# Patient Record
Sex: FEMALE | Race: WHITE | NOT HISPANIC OR LATINO | Employment: PART TIME | URBAN - NONMETROPOLITAN AREA
[De-identification: names, ages, dates, MRNs, and addresses within clinical notes are randomized per-mention and may not be internally consistent; named-entity substitution may affect disease eponyms.]

---

## 2020-01-03 PROCEDURE — 99283 EMERGENCY DEPT VISIT LOW MDM: CPT

## 2020-01-04 ENCOUNTER — HOSPITAL ENCOUNTER (EMERGENCY)
Facility: HOSPITAL | Age: 50
Discharge: HOME | End: 2020-01-04
Admitting: EMERGENCY MEDICINE

## 2020-01-04 ENCOUNTER — APPOINTMENT (OUTPATIENT)
Dept: CT IMAGING | Facility: HOSPITAL | Age: 50
End: 2020-01-04

## 2020-01-04 VITALS
WEIGHT: 189 LBS | TEMPERATURE: 97.9 F | HEIGHT: 68 IN | OXYGEN SATURATION: 99 % | DIASTOLIC BLOOD PRESSURE: 80 MMHG | SYSTOLIC BLOOD PRESSURE: 125 MMHG | RESPIRATION RATE: 18 BRPM | HEART RATE: 85 BPM | BODY MASS INDEX: 28.64 KG/M2

## 2020-01-04 DIAGNOSIS — S16.1XXA STRAIN OF NECK MUSCLE, INITIAL ENCOUNTER: ICD-10-CM
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
## 2020-01-04 DIAGNOSIS — S00.83XA CONTUSION OF FACE, INITIAL ENCOUNTER: ICD-10-CM

## 2020-01-04 DIAGNOSIS — S06.0X1A CONCUSSION WITH LOSS OF CONSCIOUSNESS OF 30 MINUTES OR LESS, INITIAL ENCOUNTER: ICD-10-CM

## 2020-01-04 PROCEDURE — 72125 CT NECK SPINE W/O DYE: CPT

## 2020-01-04 PROCEDURE — 70450 CT HEAD/BRAIN W/O DYE: CPT

## 2020-01-04 PROCEDURE — 90471 IMMUNIZATION ADMIN: CPT | Performed by: NURSE PRACTITIONER

## 2020-01-04 PROCEDURE — 90715 TDAP VACCINE 7 YRS/> IM: CPT | Performed by: NURSE PRACTITIONER

## 2020-01-04 PROCEDURE — 25010000002 TDAP 5-2.5-18.5 LF-MCG/0.5 SUSPENSION: Performed by: NURSE PRACTITIONER

## 2020-01-04 PROCEDURE — 96372 THER/PROPH/DIAG INJ SC/IM: CPT

## 2020-01-04 PROCEDURE — 70486 CT MAXILLOFACIAL W/O DYE: CPT

## 2020-01-04 PROCEDURE — 25010000002 ONDANSETRON PER 1 MG: Performed by: NURSE PRACTITIONER

## 2020-01-04 PROCEDURE — 25010000002 PROMETHAZINE PER 50 MG: Performed by: NURSE PRACTITIONER

## 2020-01-04 PROCEDURE — 25010000002 BUTORPHANOL PER 1 MG: Performed by: NURSE PRACTITIONER

## 2020-01-04 RX ORDER — CYCLOBENZAPRINE HCL 10 MG
10 TABLET ORAL 3 TIMES DAILY PRN
Qty: 20 TABLET | Refills: 0 | Status: SHIPPED | OUTPATIENT
Start: 2020-01-04

## 2020-01-04 RX ORDER — PROMETHAZINE HYDROCHLORIDE 25 MG/ML
25 INJECTION, SOLUTION INTRAMUSCULAR; INTRAVENOUS ONCE
Status: COMPLETED | OUTPATIENT
Start: 2020-01-04 | End: 2020-01-04

## 2020-01-04 RX ORDER — ONDANSETRON 2 MG/ML
4 INJECTION INTRAMUSCULAR; INTRAVENOUS ONCE
Status: COMPLETED | OUTPATIENT
Start: 2020-01-04 | End: 2020-01-04

## 2020-01-04 RX ORDER — ACETAMINOPHEN AND CODEINE PHOSPHATE 300; 30 MG/1; MG/1
1 TABLET ORAL EVERY 4 HOURS PRN
Qty: 12 TABLET | Refills: 0 | Status: SHIPPED | OUTPATIENT
Start: 2020-01-04

## 2020-01-04 RX ORDER — ONDANSETRON 4 MG/1
4 TABLET, ORALLY DISINTEGRATING ORAL EVERY 6 HOURS PRN
Qty: 10 TABLET | Refills: 0 | Status: SHIPPED | OUTPATIENT
Start: 2020-01-04

## 2020-01-04 RX ORDER — BUTORPHANOL TARTRATE 1 MG/ML
1 INJECTION, SOLUTION INTRAMUSCULAR; INTRAVENOUS ONCE
Status: COMPLETED | OUTPATIENT
Start: 2020-01-04 | End: 2020-01-04

## 2020-01-04 RX ADMIN — TETANUS TOXOID, REDUCED DIPHTHERIA TOXOID AND ACELLULAR PERTUSSIS VACCINE, ADSORBED 0.5 ML: 5; 2.5; 8; 8; 2.5 SUSPENSION INTRAMUSCULAR at 03:04

## 2020-01-04 RX ADMIN — BUTORPHANOL TARTRATE 1 MG: 1 INJECTION, SOLUTION INTRAMUSCULAR; INTRAVENOUS at 01:15

## 2020-01-04 RX ADMIN — PROMETHAZINE HYDROCHLORIDE 25 MG: 25 INJECTION INTRAMUSCULAR; INTRAVENOUS at 03:05

## 2020-01-04 RX ADMIN — ONDANSETRON HYDROCHLORIDE 4 MG: 2 SOLUTION INTRAMUSCULAR; INTRAVENOUS at 01:15

## 2020-01-04 NOTE — DISCHARGE INSTRUCTIONS
Return to ER if symptoms worsen   Ice to face      Concussion, Adult  A concussion is a brain injury from a direct hit (blow) to the head or body. This injury causes the brain to shake quickly back and forth inside the skull. It is caused by:  · A hit to the head.  · A quick and sudden movement (jolt) of the head or neck.  How fast you will get better from a concussion depends on many things. Recovery can take time. It is important to wait to return to activity until a doctor says it is safe and your symptoms are all gone.  Follow these instructions at home:  Activity  · Limit activities that need a lot of thought or concentration. You may need to talk with your  or teachers about this. Limit activities such as:  ? Homework or work for your job.  ? Watching TV.  ? Computer work.  ? Playing memory games and puzzles.  · Rest. Rest helps the brain to heal. Make sure you:  ? Get plenty of sleep at night. Do not stay up late.  ? Rest during the day. Take naps or rest breaks when you feel tired.  · Do not do activities that could cause a second concussion, such as riding a bike or playing sports. It can be dangerous if you get another concussion before the first one has healed.  · Ask your doctor when you can return to your normal activities, like driving, riding a bike, or using machinery. Your ability to react may be slower. Do not do these activities if you are dizzy. Your doctor will likely give you a plan for slowly going back to activities.  General instructions  · Take over-the-counter and prescription medicines only as told by your doctor.  · Do not drink alcohol until your doctor says you can.  · Watch your symptoms and tell other people to do the same. Other problems (complications) can happen after a concussion. Older adults with a brain injury may have a higher risk of serious problems, such as a blood clot in the brain.  · Tell your , teachers, school nurse, school counselor, , or   about your injury and symptoms. Tell them about what you can or cannot do. They should watch you for:  ? More problems with attention or concentration.  ? More trouble remembering or learning new information.  ? More time needed to do tasks or assignments.  ? Being more annoyed (irritable) or having a harder time dealing with stress.  ? Any other symptoms that get worse.  · Keep all follow-up visits as told by your doctor. This is important.  Prevention  · It is very important that you donot get another brain injury, especially before you have healed. In rare cases, another injury can cause permanent brain damage, brain swelling, or death. You have the most risk if you get another head injury in the first 7-10 days after you were hurt before. To avoid injuries:  ? Avoid activities that could make you get a second concussion, like contact sports.  ? When you have returned to sports or activities:  § Avoid plays or moves that can cause you to crash into another person. This is how most concussions happen.  § Follow the rules and be respectful of other players.  ? Get regular exercise that includes strength and balance training.  ? Wear a helmet when you do activities like:  § Biking.  § Skiing.  § Skateboarding.  § Skating.  ? Helmets can help protect you from serious skull and brain injuries, but they do not protect your from a concussion. Even when wearing a helmet, you should avoid being hit in the head.  Contact a doctor if:  · Your symptoms get worse or they do not get better.  · You have new symptoms.  · You have another injury.  Get help right away if:  · You have bad headaches or your headaches get worse.  · You have weakness in any part of your body.  · You are confused.  · Your coordination gets worse.  · You keep throwing up (vomiting).  · You feel more sleepy than normal.  · You twitch or shake violently (convulse) or have a seizure.  · Your speech is not clear (is slurred).  · You have  strange behavior changes.  · You have changes in how you see (vision).  · You pass out (lose consciousness).  Summary  · A concussion is a brain injury from a direct hit (blow) to the head or body.  · This condition is treated with rest and careful watching of symptoms.  · If you keep having symptoms, call your doctor.  This information is not intended to replace advice given to you by your health care provider. Make sure you discuss any questions you have with your health care provider.  Document Released: 12/06/2010 Document Revised: 01/29/2019 Document Reviewed: 01/29/2019  [employer] Interactive Patient Education © 2019 [employer]      Cervical Sprain    A cervical sprain is a stretch or tear in one or more of the tough, cord-like tissues that connect bones (ligaments) in the neck. Cervical sprains can range from mild to severe. Severe cervical sprains can cause the spinal bones (vertebrae) in the neck to be unstable. This can lead to spinal cord damage and can result in serious nervous system problems.  The amount of time that it takes for a cervical sprain to get better depends on the cause and extent of the injury. Most cervical sprains heal in 4-6 weeks.  What are the causes?  Cervical sprains may be caused by an injury (trauma), such as from a motor vehicle accident, a fall, or sudden forward and backward whipping movement of the head and neck (whiplash injury).  Mild cervical sprains may be caused by wear and tear over time, such as from poor posture, sitting in a chair that does not provide support, or looking up or down for long periods of time.  What increases the risk?  The following factors may make you more likely to develop this condition:  · Participating in activities that have a high risk of trauma to the neck. These include contact sports, auto racing, gymnastics, and diving.  · Taking risks when driving or riding in a motor vehicle, such as speeding.  · Having osteoarthritis of the  spine.  · Having poor strength and flexibility of the neck.  · A previous neck injury.  · Having poor posture.  · Spending a lot of time in certain positions that put stress on the neck, such as sitting at a computer for long periods of time.  What are the signs or symptoms?  Symptoms of this condition include:  · Pain, soreness, stiffness, tenderness, swelling, or a burning sensation in the front, back, or sides of the neck.  · Sudden tightening of neck muscles that you cannot control (muscle spasms).  · Pain in the shoulders or upper back.  · Limited ability to move the neck.  · Headache.  · Dizziness.  · Nausea.  · Vomiting.  · Weakness, numbness, or tingling in a hand or an arm.  Symptoms may develop right away after injury, or they may develop over a few days. In some cases, symptoms may go away with treatment and return (recur) over time.  How is this diagnosed?  This condition may be diagnosed based on:  · Your medical history.  · Your symptoms.  · Any recent injuries or known neck problems that you have, such as arthritis in the neck.  · A physical exam.  · Imaging tests, such as:  ? X-rays.  ? MRI.  ? CT scan.  How is this treated?  This condition is treated by resting and icing the injured area and doing physical therapy exercises. Depending on the severity of your condition, treatment may also include:  · Keeping your neck in place (immobilized) for periods of time. This may be done using:  ? A cervical collar. This supports your chin and the back of your head.  ? A cervical traction device. This is a sling that holds up your head. This removes weight and pressure from your neck, and it may help to relieve pain.  · Medicines that help to relieve pain and inflammation.  · Medicines that help to relax your muscles (muscle relaxants).  · Surgery. This is rare.  Follow these instructions at home:  If you have a cervical collar:    · Wear it as told by your health care provider. Do not remove the collar unless  instructed by your health care provider.  · Ask your health care provider before you make any adjustments to your collar.  · If you have long hair, keep it outside of the collar.  · Ask your health care provider if you can remove the collar for cleaning and bathing. If you are allowed to remove the collar for cleaning or bathing:  ? Follow instructions from your health care provider about how to remove the collar safely.  ? Clean the collar by wiping it with mild soap and water and drying it completely.  ? If your collar has removable pads, remove them every 1-2 days and wash them by hand with soap and water. Let them air-dry completely before you put them back in the collar.  ? Check your skin under the collar for irritation or sores. If you see any, tell your health care provider.  Managing pain, stiffness, and swelling    · If directed, use a cervical traction device as told by your health care provider.  · If directed, apply heat to the affected area before you do your physical therapy or as often as told by your health care provider. Use the heat source that your health care provider recommends, such as a moist heat pack or a heating pad.  ? Place a towel between your skin and the heat source.  ? Leave the heat on for 20-30 minutes.  ? Remove the heat if your skin turns bright red. This is especially important if you are unable to feel pain, heat, or cold. You may have a greater risk of getting burned.  · If directed, put ice on the affected area:  ? Put ice in a plastic bag.  ? Place a towel between your skin and the bag.  ? Leave the ice on for 20 minutes, 2-3 times a day.  Activity  · Do not drive while wearing a cervical collar. If you do not have a cervical collar, ask your health care provider if it is safe to drive while your neck heals.  · Do not drive or use heavy machinery while taking prescription pain medicine or muscle relaxants, unless your health care provider approves.  · Do not lift anything  that is heavier than 10 lb (4.5 kg) until your health care provider tells you that it is safe.  · Rest as directed by your health care provider. Avoid positions and activities that make your symptoms worse. Ask your health care provider what activities are safe for you.  · If physical therapy was prescribed, do exercises as told by your health care provider or physical therapist.  General instructions  · Take over-the-counter and prescription medicines only as told by your health care provider.  · Do not use any products that contain nicotine or tobacco, such as cigarettes and e-cigarettes. These can delay healing. If you need help quitting, ask your health care provider.  · Keep all follow-up visits as told by your health care provider or physical therapist. This is important.  How is this prevented?  To prevent a cervical sprain from happening again:  · Use and maintain good posture. Make any needed adjustments to your workstation to help you use good posture.  · Exercise regularly as directed by your health care provider or physical therapist.  · Avoid risky activities that may cause a cervical sprain.  Contact a health care provider if:  · You have symptoms that get worse or do not get better after 2 weeks of treatment.  · You have pain that gets worse or does not get better with medicine.  · You develop new, unexplained symptoms.  · You have sores or irritated skin on your neck from wearing your cervical collar.  Get help right away if:  · You have severe pain.  · You develop numbness, tingling, or weakness in any part of your body.  · You cannot move a part of your body (you have paralysis).  · You have neck pain along with:  ? Severe dizziness.  ? Headache.  Summary  · A cervical sprain is a stretch or tear in one or more of the tough, cord-like tissues that connect bones (ligaments) in the neck.  · Cervical sprains may be caused by an injury (trauma), such as from a motor vehicle accident, a fall, or sudden  forward and backward whipping movement of the head and neck (whiplash injury).  · Symptoms may develop right away after injury, or they may develop over a few days.  · This condition is treated by resting and icing the injured area and doing physical therapy exercises.  This information is not intended to replace advice given to you by your health care provider. Make sure you discuss any questions you have with your health care provider.  Document Released: 10/14/2008 Document Revised: 08/16/2017 Document Reviewed: 08/16/2017  [employer] Interactive Patient Education © 2019 [employer]      Cryotherapy  What is cryotherapy?  Cryotherapy, or cold therapy, is a treatment that uses cold temperatures to treat an injury or medical condition. It includes using cold packs or ice packs to reduce pain and swelling.  Who should not use cryotherapy?  Cryotherapy is not safe for people who cannot tell you if they are in pain, such as small children and people who have dementia. Cryotherapy is also not safe for people with certain conditions, such as:  · Raynaud phenomenon.  · Cold hypersensitivity.  · Numbness or loss of feeling in the area being iced.  Cryotherapy may or may not be safe for people with certain other conditions. Do not use cryotherapy without your health care provider's approval if you have:  · A heart condition.  · High blood pressure.  · Open or healing wounds.  · An infection.  · Rheumatoid arthritis.  · Poor circulation.  · Diabetes.  · Certain skin conditions.  How do I use cryotherapy?  To use cryotherapy at home to reduce pain and swelling:  · Place a towel between the cold source and your skin.  · Apply the cold source for no more than 20 minutes at a time.  · Check your skin after 5 minutes to make sure there are no signs of a poor response to cold or skin damage. Check for:  ? White spots on your skin. Your skin may look blotchy or mottled.  ? Skin that looks blue or pale.  ? Skin that feels waxy or  hard.  · Repeat these steps as many times each day as told by your health care provider.  How can I make a cold pack?  When using a cold pack at home to reduce pain and swelling, you can use:  · A silica gel cold pack that has been left in the freezer. You can buy this online or in stores.  · A plastic bag of frozen vegetables.  · A sealable plastic bag that has been filled with crushed ice.  Always wrap the pack in a dry or damp towel to avoid direct contact with your skin.  Contact a health care provider if:  · You develop white spots on your skin. This may give your skin a blotchy or mottled look.  · Your skin turns blue or pale.  · Your skin becomes waxy or hard.  · Your swelling gets worse.  This information is not intended to replace advice given to you by your health care provider. Make sure you discuss any questions you have with your health care provider.  Document Released: 08/13/2012 Document Revised: 02/22/2018 Document Reviewed: 08/31/2016  [employer] Interactive Patient Education © 2019 [employer]      Facial or Scalp Contusion    A facial or scalp contusion is a deep bruise (contusion) on the face or head. Injuries to the face and head generally cause a lot of swelling, especially around the eyes. Contusions are the result of an injury that caused bleeding under the skin. The contusion may turn blue, purple, or yellow. Minor injuries will give you a painless contusion, but more severe contusions may stay painful and swollen for a few weeks.  What are the causes?  A facial or scalp contusion is caused by a blunt injury, fall, or trauma to the face or head area.  What are the signs or symptoms?  Symptoms of this condition include:  · Swelling of the injured area.  · Discoloration of the injured area.  · Tenderness, soreness, or pain in the injured area.  How is this diagnosed?  This condition is diagnosed based on your medical history and a physical exam. An X-ray exam, CT scan, or MRI may be needed to  check for any additional injuries, such as broken bones (fractures).  How is this treated?  Often, the best treatment for a facial or scalp contusion is applying cold compresses to the injured area. Over-the-counter medicines may also be recommended for pain control.  Follow these instructions at home:  · Take over-the-counter and prescription medicines only as told by your health care provider.  · If directed, apply ice to the injured area.  ? Put ice in a plastic bag.  ? Place a towel between your skin and the bag.  ? Leave the ice on for 20 minutes, 2-3 times a day.  · Keep all follow-up visits as told by your health care provider. This is important.  Contact a health care provider if:  · You have trouble biting or chewing.  · Your pain or swelling gets worse.  · You have pain when you move your eyes.  Get help right away if:  · You have severe pain or a headache that is not relieved by medicine.  · You have unusual sleepiness, confusion, or personality changes.  · You vomit.  · You have a nosebleed that does not stop.  · You have double vision or blurred vision.  · You have a continuous clear fluid draining from your nose or ear.  · You have trouble walking or using your arms or legs.  · You have severe dizziness.  Summary  · A facial or scalp contusion is a deep bruise (contusion) on the face or head.  · Contusions are the result of an injury that caused bleeding under the skin.  · Minor injuries will give you a painless contusion, but more severe contusions may stay painful and swollen for a few weeks.  · Often, the best treatment for a facial or scalp contusion is applying cold compresses to the injured area.  This information is not intended to replace advice given to you by your health care provider. Make sure you discuss any questions you have with your health care provider.  Document Released: 01/25/2006 Document Revised: 11/07/2017 Document Reviewed: 11/07/2017  [employer] Interactive Patient Education ©  2019 [employer]      Head Injury, Adult  There are many types of head injuries. Head injuries can be as minor as a bump, or they can be more severe. More severe head injuries include:  · A jarring injury to the brain (concussion).  · A bruise of the brain (contusion). This means there is bleeding in the brain that can cause swelling.  · A cracked skull (skull fracture).  · Bleeding in the brain that collects, clots, and forms a bump (hematoma).  After a head injury, you may need to be observed for a while in the emergency department or urgent care. Sometimes admission to the hospital is needed.  After a head injury has happened, most problems occur within the first 24 hours, but side effects may occur up to 7-10 days after the injury. It is important to watch your condition for any changes.  What are the causes?  There are many possible causes of a head injury. A serious head injury may happen to someone who is in a car accident (motor vehicle collision). Other causes of major head injuries include bicycle or motorcycle accidents, sports injuries, and falls.  Risk factors  This condition is more likely to occur in people who:  · Drink a lot of alcohol or use drugs.  · Are over the age of 65.  · Are at risk for falls.  What are the symptoms?  There are many possible symptoms of a head injury. Visible symptoms of a head injury include a bruise, bump, or bleeding at the site of the injury. Other non-visible symptoms include:  · Feeling sleepy or not being able to stay awake.  · Passing out.  · Headache.  · Seizures.  · Dizziness.  · Confusion.  · Memory problems.  · Nausea or vomiting.  Other possible symptoms that may develop after the head injury include:  · Poor attention and concentration.  · Fatigue or tiring easily.  · Irritability.  · Being uncomfortable around bright lights or loud noises.  · Anxiety or depression.  · Disturbed sleep.  How is this diagnosed?  This condition can usually be diagnosed based on  your symptoms, a description of the injury, and a physical exam. You may also have imaging tests done, such as a CT scan or MRI. You will also be closely watched.  How is this treated?  Treatment for this condition depends on the severity and type of injury you have. The main goal of treatment is to prevent complications and allow the brain time to heal.  For mild head injury, you may be sent home and treatment may include:  · Observation. A responsible adult should stay with you for 24 hours after your injury and check on you often.  · Physical rest.  · Brain rest.  · Pain medicines.  For severe brain injury, treatment may include:  · Close observation. This includes hospitalization with frequent physical exams. You may need to go to a hospital that specializes in head injury.  · Pain medicines.  · Breathing support. This may include using a ventilator.  · Managing the pressure inside the brain (intracranial pressure, or ICP). This may include:  ? Monitoring the ICP.  ? Giving medicines to decrease the ICP.  ? Positioning you to decrease the ICP.  · Medicine to prevent seizures.  · Surgery to stop bleeding or to remove blood clots (craniotomy).  · Surgery to remove part of the skull (decompressive craniectomy). This allows room for the brain to swell.  Follow these instructions at home:  Activity  · Rest as much as possible and avoid activities that are physically hard or tiring.  · Make sure you get enough sleep.  · Limit activities that require a lot of thought or attention, such as:  ? Watching TV.  ? Playing memory games and puzzles.  ? Job-related work or homework.  ? Working on the computer, social media, and texting.  · Avoid activities that could cause another head injury, such as playing sports, until your health care provider approves. Having another head injury, especially before the first one has healed, can be dangerous.  · Ask your health care provider when it is safe for you to return to your regular  activities, including work or school. Ask your health care provider for a step-by-step plan for gradually returning to activities.  · Ask your health care provider when you can drive, ride a bicycle, or use heavy machinery. Your ability to react may be slower after a brain injury. Never do these activities if you are dizzy.  Lifestyle  · Do not drink alcohol until your health care provider approves, and avoid drug use. Alcohol and certain drugs may slow your recovery and can put you at risk of further injury.  · If it is harder than usual to remember things, write them down.  · If you are easily distracted, try to do one thing at a time.  · Talk with family members or close friends when making important decisions.  · Tell your friends, family, a trusted colleague, and  about your injury, symptoms, and restrictions. Have them watch for any new or worsening problems.  General instructions  · Take over-the-counter and prescription medicines only as told by your health care provider.  · Have someone stay with you for 24 hours after your head injury. This person should watch you for any changes in your symptoms and be ready to seek medical help, as needed.  · Keep all follow-up visits as told by your health care provider. This is important.  How is this prevented?  · Work on improving your balance and strength to avoid falls.  · Wear a seatbelt when you are in a moving vehicle.  · Wear a helmet when riding a bicycle, skiing, or doing any other sport or activity that has a risk of injury.  · Drink alcohol only in moderation.  · Take safety measures in your home, such as:  ? Removing clutter and tripping hazards from floors and stairways.  ? Using grab bars in bathrooms and handrails by stairs.  ? Placing non-slip mats on floors and in bathtubs.  ? Improving lighting in dim areas.  Get help right away if:  · You have:  ? A severe headache that is not helped by medicine.  ? Trouble walking, have weakness in your  arms and legs, or lose your balance.  ? Clear or bloody fluid coming from your nose or ears.  ? Changes in your vision.  ? A seizure.  · You vomit.  · Your symptoms get worse.  · Your speech is slurred.  · You pass out.  · You are sleepier and have trouble staying awake.  · Your pupils change size.  These symptoms may represent a serious problem that is an emergency. Do not wait to see if the symptoms will go away. Get medical help right away. Call your local emergency services (911 in the [place] Do not drive yourself to the hospital.  This information is not intended to replace advice given to you by your health care provider. Make sure you discuss any questions you have with your health care provider.  Document Released: 12/18/2006 Document Revised: 04/13/2018 Document Reviewed: 06/27/2017  [employer] Interactive Patient Education © 2019 [employer]      Post-Concussion Syndrome    Post-concussion syndrome is when symptoms last longer than normal after a head injury.  What are the signs or symptoms?  After a head injury, you may:  · Have headaches.  · Feel tired.  · Feel dizzy.  · Feel weak.  · Have trouble seeing.  · Have trouble in bright lights.  · Have trouble hearing.  · Not be able to remember things.  · Not be able to focus.  · Have trouble sleeping.  · Have mood swings.  · Have trouble learning new things.  These can last from weeks to months.  Follow these instructions at home:  Medicines  · Take all medicines only as told by your doctor.  · Do not take prescription pain medicines.  Activity  · Limit activities as told by your doctor. This includes:  ? Homework.  ? Job-related work.  ? Thinking.  ? Watching TV.  ? Using a computer or phone.  ? Puzzles.  ? Exercise.  ? Sports.  · Slowly return to your normal activity as told by your doctor.  · Stop an activity if you have symptoms.  · Do not do anything that may cause you to get injured again.  General instructions  · Rest. Try to:  ? Sleep 7-9 hours each  night.  ? Take naps or breaks when you feel tired during the day.  · Do not drink alcohol until your doctor says that you can.  · Keep track of your symptoms.  · Keep all follow-up visits as told by your doctor. This is important.  Contact a doctor if:  · You do not improve.  · You get worse.  · You have another injury.  Get help right away if:  · You have a very bad headache.  · You feel confused.  · You feel very sleepy.  · You pass out (faint).  · You throw up (vomit).  · You feel weak in any part of your body.  · You feel numb in any part of your body.  · You start shaking (have a seizure).  · You have trouble talking.  Summary  · Post-concussion syndrome is when symptoms last longer than normal after a head injury.  · Limit all activity after your injury. Gradually return to normal activity as told by your doctor.  · Rest, do not drink alcohol, and avoid prescription pain medicines after a concussion.  · Call your doctor if your symptoms get worse.  This information is not intended to replace advice given to you by your health care provider. Make sure you discuss any questions you have with your health care provider.  Document Released: 01/25/2006 Document Revised: 01/22/2019 Document Reviewed: 01/22/2019  [employer] Interactive Patient Education © 2019 [employer]

## 2020-01-04 NOTE — ED PROVIDER NOTES
Subjective   Patient is a 49-year-old white female presents emergency department after tripping and falling on her way out of her workplace around 10:00 this evening.  She states she tripped over her dress and fell striking her face on the ground.  Patient reports that she did have loss of consciousness.  She is complaining of right jaw pain and right-sided neck pain as well.  She has had a headache since then with some blurred vision as well.  She denies any diplopia.  She denies any numbness or focal weakness.  She has had some intermittent nausea without vomiting.  She denies any other injury.      History provided by:  Patient   used: No        Review of Systems   Constitutional: Negative.    HENT:        Patient is a 49-year-old white female presents emergency department after tripping and falling on her way out of her workplace around 10:00 this evening.  She states she tripped over her dress and fell striking her face on the ground.  Patient reports that she did have loss of consciousness.  She is complaining of right jaw pain and right-sided neck pain as well.  She has had a headache since then with some blurred vision as well.  She denies any diplopia.  She denies any numbness or focal weakness.  She has had some intermittent nausea without vomiting.  She denies any other injury.     Eyes: Negative.    Respiratory: Negative.    Cardiovascular: Negative.    Gastrointestinal: Negative.    Endocrine: Negative.    Genitourinary: Negative.    Musculoskeletal: Negative.    Skin: Negative.    Allergic/Immunologic: Negative.    Neurological: Negative.    Hematological: Negative.    Psychiatric/Behavioral: Negative.    All other systems reviewed and are negative.      History reviewed. No pertinent past medical history.    No Known Allergies    Past Surgical History:   Procedure Laterality Date   • APPENDECTOMY     • HYSTERECTOMY         History reviewed. No pertinent family history.    Social  "History     Socioeconomic History   • Marital status:      Spouse name: Not on file   • Number of children: Not on file   • Years of education: Not on file   • Highest education level: Not on file   Tobacco Use   • Smoking status: Never Smoker   Substance and Sexual Activity   • Alcohol use: Not Currently     Frequency: Never   • Drug use: Never       Prior to Admission medications    Not on File       /80   Pulse 85   Temp 97.9 °F (36.6 °C) (Oral)   Resp 18   Ht 172.7 cm (68\")   Wt 85.7 kg (189 lb)   SpO2 99%   BMI 28.74 kg/m²     Objective   Physical Exam   Constitutional: She is oriented to person, place, and time. She appears well-developed and well-nourished.   A/o x3   HENT:   Head: Normocephalic and atraumatic.   Superficial abrasions noted to the right forehead and right cheek.  She has tenderness to the right mandible with opening closing of the mouth.  There is no malocclusion noted.  There is moderate amount of soft tissue swelling noted to the right side of the face as well.   Eyes: Pupils are equal, round, and reactive to light. Conjunctivae and EOM are normal.   Neck: Normal range of motion. Neck supple. No tracheal deviation present. No thyromegaly present.   Patient has tenderness on palpation to the right lateral cervical spine.  There is no step-off or laxity noted.  Patient was placed in a cervical immobilization.   Cardiovascular: Normal rate, regular rhythm, normal heart sounds and intact distal pulses.   Pulmonary/Chest: Effort normal and breath sounds normal. No respiratory distress. She has no wheezes. She has no rales. She exhibits no tenderness.   Abdominal: Soft. Bowel sounds are normal.   Musculoskeletal: Normal range of motion.   Neurological: She is alert and oriented to person, place, and time. She has normal reflexes. No cranial nerve deficit.   Moves all extremities well.  DTRs are intact.   Skin: Skin is warm and dry.   Psychiatric: She has a normal mood and " affect. Her behavior is normal. Judgment and thought content normal.   Nursing note and vitals reviewed.      Procedures         Lab Results (last 24 hours)     ** No results found for the last 24 hours. **          CT Head Without Contrast   Final Result   1. No CT evidence of an acute intracranial process.                                  This report was finalized on 01/04/2020 07:04 by Dr. Kevin Plasencia MD.      CT Facial Bones Without Contrast   Final Result   1. No CT evidence of facial bone fracture.   This report was finalized on 01/04/2020 07:08 by Dr. Kevin Plasencia MD.      CT Cervical Spine Without Contrast   Final Result   1. No CT evidence of acute bony injury to the cervical spine.   This report was finalized on 01/04/2020 07:11 by Dr. Kevin Plasencia MD.          ED Course  ED Course as of Jan 05 0239   Sat Jan 04, 2020   0141 Pending ct scans at this time     [CW]   0212 Pending ct scan reports at this time     [CW]   0231 CAT scan of the head was completed due to patient having a fall and striking her face on concrete.  She did have loss of consciousness and has had nausea and blurred vision since that time.  CT of the head is negative for anything acute.  CT of the cervical spine there is no acute fracture.  CT of the facial bones no acute fracture noted.  Reviewed the results with patient and patient family.  Patient states she is still very nauseated at this time after receiving Zofran.  Have ordered Phenergan 25 mg IM at this time.  Patient be discharged home shortly in stable condition.  Advised to apply ice to her face.  Advised to return the emergency department if her symptoms worsen. Attempted to complete darío report and unable to do so due to system failure- do not suspect suspicious activity. Will prescribe small amt of pain medication for acute pain. Reviewed side effects and potential for abuse     [CW]      ED Course User Index  [CW] Monique Nath, APRN          [facility]  Number of  Diagnoses or Management Options  Concussion with loss of consciousness of 30 minutes or less, initial encounter: minor  Contusion of face, initial encounter: minor  Fall, initial encounter: minor  Strain of neck muscle, initial encounter: minor     Amount and/or Complexity of Data Reviewed  Tests in the radiology section of CPT®: ordered and reviewed    Patient Progress  Patient progress: stable      Final diagnoses:   Fall, initial encounter   Concussion with loss of consciousness of 30 minutes or less, initial encounter   Strain of neck muscle, initial encounter   Contusion of face, initial encounter          Monique Nath, APRN  01/05/20 0239

## 2020-09-10 ENCOUNTER — TRANSCRIBE ORDERS (OUTPATIENT)
Dept: ADMINISTRATIVE | Facility: HOSPITAL | Age: 50
End: 2020-09-10

## 2020-09-10 ENCOUNTER — LAB (OUTPATIENT)
Dept: LAB | Facility: HOSPITAL | Age: 50
End: 2020-09-10

## 2020-09-10 DIAGNOSIS — Z20.828 EXPOSURE TO SARS-ASSOCIATED CORONAVIRUS: ICD-10-CM

## 2020-09-10 DIAGNOSIS — Z20.828 EXPOSURE TO SARS-ASSOCIATED CORONAVIRUS: Primary | ICD-10-CM

## 2020-09-10 LAB
SARS-COV-2 IGG SERPLBLD QL IA.RAPID: NOT DETECTED
SARS-COV-2 IGM SERPLBLD QL IA.RAPID: NOT DETECTED

## 2020-09-10 PROCEDURE — 36415 COLL VENOUS BLD VENIPUNCTURE: CPT

## 2020-09-10 PROCEDURE — 86328 IA NFCT AB SARSCOV2 COVID19: CPT | Performed by: NURSE PRACTITIONER

## 2025-06-08 ENCOUNTER — OFFICE VISIT (OUTPATIENT)
Age: 55
End: 2025-06-08

## 2025-06-08 VITALS
DIASTOLIC BLOOD PRESSURE: 72 MMHG | RESPIRATION RATE: 22 BRPM | HEART RATE: 110 BPM | OXYGEN SATURATION: 96 % | SYSTOLIC BLOOD PRESSURE: 122 MMHG | WEIGHT: 198.4 LBS | TEMPERATURE: 98.7 F

## 2025-06-08 DIAGNOSIS — J01.90 ACUTE BACTERIAL SINUSITIS: Primary | ICD-10-CM

## 2025-06-08 DIAGNOSIS — R09.81 NASAL CONGESTION: ICD-10-CM

## 2025-06-08 DIAGNOSIS — H65.93 BILATERAL NON-SUPPURATIVE OTITIS MEDIA: ICD-10-CM

## 2025-06-08 DIAGNOSIS — B96.89 ACUTE BACTERIAL SINUSITIS: Primary | ICD-10-CM

## 2025-06-08 DIAGNOSIS — J02.9 SORE THROAT: ICD-10-CM

## 2025-06-08 LAB — S PYO AG THROAT QL: NORMAL

## 2025-06-08 RX ORDER — MECLIZINE HYDROCHLORIDE 25 MG/1
25 TABLET ORAL 3 TIMES DAILY PRN
COMMUNITY

## 2025-06-08 RX ORDER — BUPROPION HYDROCHLORIDE 300 MG/1
300 TABLET ORAL EVERY MORNING
COMMUNITY

## 2025-06-08 RX ORDER — METHYLPREDNISOLONE 4 MG/1
TABLET ORAL
Qty: 1 KIT | Refills: 0 | Status: SHIPPED | OUTPATIENT
Start: 2025-06-08 | End: 2025-06-14